# Patient Record
Sex: FEMALE | Race: WHITE | NOT HISPANIC OR LATINO
[De-identification: names, ages, dates, MRNs, and addresses within clinical notes are randomized per-mention and may not be internally consistent; named-entity substitution may affect disease eponyms.]

---

## 2018-02-08 ENCOUNTER — TRANSCRIPTION ENCOUNTER (OUTPATIENT)
Age: 33
End: 2018-02-08

## 2018-11-13 ENCOUNTER — TRANSCRIPTION ENCOUNTER (OUTPATIENT)
Age: 33
End: 2018-11-13

## 2020-05-21 PROBLEM — Z00.00 ENCOUNTER FOR PREVENTIVE HEALTH EXAMINATION: Status: ACTIVE | Noted: 2020-05-21

## 2020-05-26 ENCOUNTER — APPOINTMENT (OUTPATIENT)
Dept: OBGYN | Facility: CLINIC | Age: 35
End: 2020-05-26

## 2021-06-08 ENCOUNTER — APPOINTMENT (OUTPATIENT)
Dept: CARDIOLOGY | Facility: CLINIC | Age: 36
End: 2021-06-08
Payer: COMMERCIAL

## 2021-06-08 ENCOUNTER — NON-APPOINTMENT (OUTPATIENT)
Age: 36
End: 2021-06-08

## 2021-06-08 VITALS
HEIGHT: 59 IN | OXYGEN SATURATION: 99 % | DIASTOLIC BLOOD PRESSURE: 70 MMHG | HEART RATE: 84 BPM | SYSTOLIC BLOOD PRESSURE: 102 MMHG | TEMPERATURE: 98.5 F | BODY MASS INDEX: 21.57 KG/M2 | WEIGHT: 107 LBS

## 2021-06-08 PROCEDURE — 99204 OFFICE O/P NEW MOD 45 MIN: CPT | Mod: 25

## 2021-06-08 PROCEDURE — 99072 ADDL SUPL MATRL&STAF TM PHE: CPT

## 2021-06-08 PROCEDURE — 93000 ELECTROCARDIOGRAM COMPLETE: CPT | Mod: 59

## 2021-06-08 NOTE — HISTORY OF PRESENT ILLNESS
[FreeTextEntry1] : The patient has always enjoyed excellent health. There is no known history of heart disease, serious cardiac arrhythmias, heart murmur, congestive heart failure or significant chest pain. The patient exercises regularly without difficulty. She has had 2 normal pregnancies. The patient is a nonsmoker and nondrinker. There is no history of illicit drug use.

## 2021-06-08 NOTE — DISCUSSION/SUMMARY
[FreeTextEntry1] : Patient's cardiac examination is entirely normal. EKG is normal. There was a single PVC noted on the rhythm strip. I don't find any evidence of significant underlying cardiac pathology. However in order to diagnose the exact frequency of the arrhythmia we are doing a cardiac monitor. We will then decide if any further treatment is necessary. I feel the patient might also benefit from relaxation methods. She is also advised to continue her program of regular exercise.

## 2021-06-08 NOTE — REASON FOR VISIT
[FreeTextEntry1] : Patient was seen 9 days ago at [facility] complaining of palpitations. The patient noticed a very irregular heart beat and pounding heart beats which were very upsetting to her. In the ER evidently no major problems were found and she was discharged home for followup. The patient has had a history of occasional palpitations over the years. At the time of the ER admission they had become considerably worse but since then they have subsided.

## 2021-07-12 ENCOUNTER — APPOINTMENT (OUTPATIENT)
Dept: CARDIOLOGY | Facility: CLINIC | Age: 36
End: 2021-07-12
Payer: COMMERCIAL

## 2021-07-12 VITALS
HEART RATE: 90 BPM | SYSTOLIC BLOOD PRESSURE: 120 MMHG | DIASTOLIC BLOOD PRESSURE: 70 MMHG | WEIGHT: 197 LBS | BODY MASS INDEX: 39.72 KG/M2 | HEIGHT: 59 IN | TEMPERATURE: 98.6 F

## 2021-07-12 DIAGNOSIS — R00.2 PALPITATIONS: ICD-10-CM

## 2021-07-12 PROCEDURE — 99072 ADDL SUPL MATRL&STAF TM PHE: CPT

## 2021-07-12 PROCEDURE — 99213 OFFICE O/P EST LOW 20 MIN: CPT

## 2021-07-12 NOTE — DISCUSSION/SUMMARY
[FreeTextEntry1] : The patient had a recent two-week event recorder. There is a long delay on the part of the company in providing us with the results. We are still awaiting the final report. The patient's clinical examination today is stable and she received a hypnosis/relaxation therapy with very favorable results. We will continue to follow the patient's progress.

## 2021-07-12 NOTE — REASON FOR VISIT
[FreeTextEntry1] : Patient returns today for followup and relaxation therapy. Her general condition has been stable. The patient does still experience occasional palpitations consisting of an irregular heartbeat. she does exercise regularly without difficulty. There have been no exertional symptoms.

## 2022-07-07 ENCOUNTER — TRANSCRIPTION ENCOUNTER (OUTPATIENT)
Age: 37
End: 2022-07-07

## 2022-08-24 ENCOUNTER — APPOINTMENT (OUTPATIENT)
Dept: OBGYN | Facility: CLINIC | Age: 37
End: 2022-08-24

## 2022-12-06 ENCOUNTER — NON-APPOINTMENT (OUTPATIENT)
Age: 37
End: 2022-12-06

## 2022-12-11 ENCOUNTER — NON-APPOINTMENT (OUTPATIENT)
Age: 37
End: 2022-12-11

## 2023-11-14 ENCOUNTER — NON-APPOINTMENT (OUTPATIENT)
Age: 38
End: 2023-11-14

## 2025-01-17 ENCOUNTER — NON-APPOINTMENT (OUTPATIENT)
Age: 40
End: 2025-01-17

## 2025-02-04 ENCOUNTER — NON-APPOINTMENT (OUTPATIENT)
Age: 40
End: 2025-02-04

## 2025-06-28 ENCOUNTER — NON-APPOINTMENT (OUTPATIENT)
Age: 40
End: 2025-06-28